# Patient Record
Sex: MALE | Race: WHITE | NOT HISPANIC OR LATINO | Employment: FULL TIME | URBAN - METROPOLITAN AREA
[De-identification: names, ages, dates, MRNs, and addresses within clinical notes are randomized per-mention and may not be internally consistent; named-entity substitution may affect disease eponyms.]

---

## 2017-01-19 ENCOUNTER — GENERIC CONVERSION - ENCOUNTER (OUTPATIENT)
Dept: OTHER | Facility: OTHER | Age: 62
End: 2017-01-19

## 2017-06-29 ENCOUNTER — GENERIC CONVERSION - ENCOUNTER (OUTPATIENT)
Dept: OTHER | Facility: OTHER | Age: 62
End: 2017-06-29

## 2017-07-13 ENCOUNTER — GENERIC CONVERSION - ENCOUNTER (OUTPATIENT)
Dept: OTHER | Facility: OTHER | Age: 62
End: 2017-07-13

## 2018-01-10 NOTE — RESULT NOTES
Verified Results  (1) TISSUE EXAM 53Mzl6075 02:07PM Neris Nice     Test Name Result Flag Reference   LAB AP CASE REPORT (Report)     Surgical Pathology Report             Case: [id number]                   Authorizing Provider: Kiara Hall MD      Collected:      09/30/2016 1407        Ordering Location:   [address] [place] [address]   Received:      09/30/2016 [address] End[facility]                               Pathologist:      Tammy Garzon MD                             Specimens:  A) - Duodenum, r/o celiac disease; wt loss                               B) - Stomach, antrum gastritis r/o h pylori                              C) - Polyp, Colorectal, ascending colon polyp                             D) - Polyp, Colorectal, transverse colon polyp                             E) - Polyp, Colorectal, sigmoid colon polyp   LAB AP FINAL DIAGNOSIS (Report)     A  Duodenum, biopsy:    - No significant histologic abnormality     - No increase in intraepithelial lymphocytes, villous blunting, acute   and chronic inflammation     - Negative for granulomas, dysplasia and malignancy  B  Gastric antrum, biopsy:    - Chronic inactive gastritis  - Negative for Helicobacter pylori organisms (immunohistochemical stain   with appropriate positive control)  - Negative for intestinal metaplasia, dysplasia and malignancy  C  Ascending colon polyp:    - Tubular adenoma  - Negative for high grade dysplasia and malignancy  D  Transverse colon polyp:    - Tubular adenoma  - Negative for high grade dysplasia and malignancy  E  Sigmoid colon polyp:    - Tubular adenoma  - Negative for high grade dysplasia and malignancy    Electronically signed by Tammy Garzon MD on 10/4/2016 at 10:47 AM   LAB AP NOTE      Interpretation performed at , [address]   LAB AP SURGICAL ADDITIONAL INFORMATION (Report)     These tests were developed and their performance characteristics   determined by [facility] or [place]  They may not be cleared or approved by the [place]  Food and   Drug Administration  The FDA has determined that such clearance or   approval is not necessary  These tests are used for clinical purposes  They should not be regarded as investigational or for research  This   laboratory has been approved by [facility], designated as a high-complexity   laboratory and is qualified to perform these tests  LAB AP GROSS DESCRIPTION (Report)     A  The specimen is received in formalin, labeled with the patient's name   and hospital number, and is designated duodenum rule out celiac   disease  The specimen consists of multiple tan soft tissue fragments   measuring in aggregate 0 5 x 0 4 x 0 1 cm  Entirely submitted  One   cassette  B  The specimen is received in formalin, labeled with the patient's name   and hospital number, and is designated antrum gastritis rule out H    pylori  The specimen consists of multiple tan red soft tissue fragments   measuring in aggregate 0 6 x 0 5 x 0 1 cm  Entirely submitted  One   cassette  Note: The estimated total formalin fixation time based upon information   provided by the submitting clinician and the standard processing schedule   is 14 25 hours  C  The specimen is received in formalin, labeled with the patient's name   and hospital number, and is designated ascending colon polyp  The   specimen consists of 2 tan soft tissue fragments measuring 0 3 and 0 4 cm  Entirely submitted  One cassette  D  The specimen is received in formalin, labeled with the patient's name   and hospital number, and is designated transverse colon polyp  The   specimen consists of a single tan soft tissue fragment measuring 0 4 cm  Entirely submitted  One cassette      E  The specimen is received in formalin, labeled with the patient's name   and hospital number, and is designated sigmoid colon polyp  The   specimen consists of several tan soft tissue fragments measuring in   aggregate 0 6 x 0 5 x 0 1 cm  Entirely submitted  One cassette  Note: The estimated total formalin fixation time based upon information   provided by the submitting clinician and the standard processing schedule   is 14 0 hours      MAC

## 2018-01-11 NOTE — RESULT NOTES
Message   Recorded as Task   Date: 09/19/2016 12:20 PM, Created By: Cristóbal Koch   Task Name: Call Patient with results   Assigned To: Cristóbal Koch   Regarding Patient: Kristan Estrada, Status: Active   Comment:    Cristóbal Koch - 19 Sep 2016 12:20 PM     Patient Phone: [phone]    CT of chest out of pelvis showed and pelvis were benign  Chest shows left lower lobe consolidation most consistent with pneumonia  Please send in a prescription for Levaquin 500 mg #10 tablets to take one daily for 10 days  Also there are multiple small pulmonary nodules seen  They will need to be followed up every 6 months for 2 years  To ensure they are benign  Radiology recommends a repeat CT chest in 2 months, I did put this in computer  Joy Sesay - 19 Sep 2016 2:47 PM     TASK EDITED                 pt notified of results and instructions    antibiotic sent in to [facility] ([facility])   mailed orders for repeat CT scans mailed to pt     Signatures   Electronically signed by : Lawrence Eastman, ; Sep 19 2016  2:48PM EST                       (Author)

## 2018-01-12 NOTE — MISCELLANEOUS
Message  The patient had missed his new patient appointment on 12/15/16  Per Dr Alla Santacruz no need for appointment sodium levels stable  Chris Dash      Active Problems    1  Abnormal weight loss (783 21) (R63 4)   2  Allergic rhinitis (477 9) (J30 9)   3  Appetite loss (783 0) (R63 0)   4  Blurry vision (368 8) (H53 8)   5  Chest discomfort (786 59) (R07 89)   6  Cough (786 2) (R05)   7  Dyspnea (786 09) (R06 00)   8  Excessive thirst (783 5) (R63 1)   9  Heme positive stool (792 1) (R19 5)   10  Hyperglycemia (790 29) (R73 9)   11  Hyperlipidemia (272 4) (E78 5)   12  Hyponatremia (276 1) (E87 1)   13  Increased frequency of urination (788 41) (R35 0)   14  Left lower lobe pneumonia (486) (J18 1)   15  Leukocytosis (288 60) (D72 829)   16  Nocturia (788 43) (R35 1)   17  Pulmonary nodules (793 19) (R91 8)   18  Tobacco use (305 1) (Z72 0)    Current Meds   1  Benzonatate 200 MG Oral Capsule; Take one capsule orally 3 times daily as needed for   cough; Therapy: 11AXX2358 to (Last Rx:15Oct2016) Ordered   2  LevoFLOXacin 500 MG Oral Tablet (Levaquin); Take 1 tablet daily; Therapy: 61WGS6700 to (Last Rx:10Bip1573)  Requested for: 15Oct2016 Ordered   3  Montelukast Sodium 10 MG Oral Tablet (Singulair); Take 1 tablet daily; Therapy: 91AKM2356 to (Last Rx:15Oct2016) Ordered    Allergies    1  No Known Drug Allergies    Signatures   Electronically signed by :  LIZETH Mathews ; Aug 28 2017  8:47AM EST

## 2018-01-12 NOTE — RESULT NOTES
Verified Results  (1) CBC/ PLT (NO DIFF) 22Hys7140 07:19AM Maura Ferrara Order Number: [id number]     Test Name Result Flag Reference   HEMATOCRIT 38 8 %  36 5-49 3   HEMOGLOBIN 13 1 g/dL  12 0-17 0   MCHC 33 8 g/dL  31 4-37 4   MCH 30 1 pg  26 8-34 3   MCV 89 fL  82-98   PLATELET COUNT 630 Thousands/uL  149-390   RBC COUNT 4 35 Million/uL  3 88-5 62   RDW 14 2 %  11 6-15 1   WBC COUNT 11 75 Thousand/uL H 4 31-10 16   MPV 8 8 fL L 8 9-12 7     (1) COMPREHENSIVE METABOLIC PANEL 71BAG6902 20:03NA Maura Ferrara Order Number: [id number]     Test Name Result Flag Reference   GLUCOSE,RANDM 86 mg/dL     If the patient is fasting, the ADA then defines impaired fasting glucose as > 100 mg/dL and diabetes as > or equal to 123 mg/dL  SODIUM 131 mmol/L L 136-145   POTASSIUM 4 8 mmol/L  3 5-5 3   CHLORIDE 96 mmol/L L 100-108   CARBON DIOXIDE 28 mmol/L  21-32   ANION GAP (CALC) 7 mmol/L  4-13   BLOOD UREA NITROGEN 9 mg/dL  5-25   CREATININE 0 76 mg/dL  0 60-1 30   Standardized to IDMS reference method   CALCIUM 8 9 mg/dL  8 3-10 1   BILI, TOTAL 0 30 mg/dL  0 20-1 00   ALK PHOSPHATAS 89 U/L     ALT (SGPT) 28 U/L  12-78   AST(SGOT) 22 U/L  5-45   ALBUMIN 3 7 g/dL  3 5-5 0   TOTAL PROTEIN 6 9 g/dL  6 4-8 2   eGFR Non-African American      >60 0 ml/min/1 73sq m   - Patient Instructions: This is a fasting blood test  Water,black tea or black  coffee only after 9:00pm the night before test Drink 2 glasses of water the morning of test - Patient Instructions: This bloodwork is non-fasting  Please drink two glasses of   water morning of bloodwork  National Kidney Disease Education Program recommendations are as follows:  GFR calculation is accurate only with a steady state creatinine  Chronic Kidney disease less than 60 ml/min/1 73 sq  meters  Kidney failure less than 15 ml/min/1 73 sq  meters       (1) HEMOGLOBIN A1C 82Yfg6514 07:19AM Alejandra Koch Order Number: [id number]     Test Name Result Flag Reference   HEMOGLOBIN A1C 5 1 %  4 2-6 3   EST  AVG  GLUCOSE 100 mg/dl       (1) LIPID PANEL, FASTING 97Vst4235 07:19AM Jimmy Maid Order Number: [id number]     Test Name Result Flag Reference   CHOLESTEROL 167 mg/dL     HDL,DIRECT 58 mg/dL  40-60   Specimen collection should occur prior to Metamizole administration due to the potential for falsely depressed results  LDL CHOLESTEROL CALCULATED 96 mg/dL  0-100   - Patient Instructions: This is a fasting blood test  Water,black tea or black  coffee only after 9:00pm the night before test   Drink 2 glasses of water the morning of test     - Patient Instructions: This is a fasting blood test  Water,black tea or black  coffee only after 9:00pm the night before test Drink 2 glasses of water the morning of test - Patient Instructions: This bloodwork is non-fasting  Please drink two glasses of   water morning of bloodwork  Triglyceride:         Normal              <150 mg/dl       Borderline High    150-199 mg/dl       High               200-499 mg/dl       Very High          >499 mg/dl  Cholesterol:         Desirable        <200 mg/dl      Borderline High  200-239 mg/dl      High             >239 mg/dl  HDL Cholesterol:        High    >59 mg/dL      Low     <41 mg/dL  LDL CALCULATED:    This screening LDL is a calculated result  It does not have the accuracy of the Direct Measured LDL in the monitoring of patients with hyperlipidemia and/or statin therapy  Direct Measure LDL (EWF125) must be ordered separately in these patients  TRIGLYCERIDES 65 mg/dL  <=150   Specimen collection should occur prior to N-Acetylcysteine or Metamizole administration due to the potential for falsely depressed results  (1) TSH 16Sep2016 07:19AM Jimmy Maid Order Number: [id number]     Test Name Result Flag Reference   TSH 0 688 uIU/mL  0 358-3 740   - Patient Instructions:  This bloodwork is non-fasting  Please drink two glasses of water morning of bloodwork  - Patient Instructions: This is a fasting blood test  Water,black tea or black  coffee only after 9:00pm the night before test Drink 2 glasses of water the morning of test - Patient Instructions: This bloodwork is non-fasting  Please drink two glasses of   water morning of bloodwork  Patients undergoing fluorescein dye angiography may retain small amounts of fluorescein in the body for 48-72 hours post procedure  Samples containing fluorescein can produce falsely depressed TSH values  If the patient had this procedure,a specimen should be resubmitted post fluorescein clearance  (1) URINALYSIS (will reflex a microscopy if leukocytes, occult blood, protein or nitrites are not within normal limits) 15Ufy5321 07:19AM Loralie Meigs Order Number: [id number]     Test Name Result Flag Reference   COLOR Yellow     CLARITY Clear     SPECIFIC GRAVITY UA 1 014  1 003-1 030   PH UA 6 5  4 5-8 0   LEUKOCYTE ESTERASE UA Negative  Negative   NITRITE UA Negative  Negative   PROTEIN UA Negative mg/dl  Negative   GLUCOSE UA Negative mg/dl  Negative   KETONES UA Negative mg/dl  Negative   UROBILINOGEN UA 0 2 E U /dl  0 2, 1 0 E U /dl   BILIRUBIN UA Negative  Negative   BLOOD UA Negative  Negative     (1) PSA, DIAGNOSTIC (FOLLOW-UP) 16Sep2016 07:19AM Loralie Meigs Order Number: [id number]     Test Name Result Flag Reference   PSA 0 5 ng/mL  0 0-4 0       Plan  Hyponatremia, Increased frequency of urination, Leukopenia    · (1) BASIC METABOLIC PROFILE; Status:Active; Requested for:36Iol1323;    · (1) CBC/ PLT (NO DIFF); Status:Active;  Requested YQO:[id number];

## 2018-01-13 NOTE — RESULT NOTES
Message  Notified by radiology on 6/28/17 patient never completed CAT scan that was ordered 11/16  Patient is on multiple pulmonary nodules  Will my office set up patient for repeat CAT scan of chest      Plan  Pulmonary nodules    · * CT CHEST WO CONTRAST; Status:Need Information - Financial Authorization;   Requested ALANNAH:[id number];     Signatures   Electronically signed by : Rogelio Thakkar DO; Jun 28 2017 11:34AM EST                       (Author)

## 2018-01-13 NOTE — MISCELLANEOUS
Message   Recorded as Task   Date: 06/28/2017 11:35 AM, Created By: Afshin Miller   Task Name: Call Back   Assigned To: Cristóbal Koch   Regarding Patient: WERO GERARD, Status: In Progress   Comment:    Cristóbal Koch - 28 Jun 2017 11:35 AM     TASK CREATED  Notified by radiology patient had an abnormal CAT scan of chest 9/16  Up pulmonary nodules and pneumonia, repeat was ordered 11/16 patient did not complete  Please have patient complete CAT scan of chest, order in computer the make appointment with me to discuss   Dustin Barrett - 29 Jun 2017 10:23 AM     TASK IN [place] - 29 Jun 2017 10:26 AM     TASK REPLIED TO: Previously Assigned To Baljinder and Ileana,Clinical Team                      Pt  states he is aware of this but is not going to have the repeat CT scan done  He does understand that this is advised but states "I will be fine and am not going to go"  Active Problems    1  Abnormal weight loss (783 21) (R63 4)   2  Allergic rhinitis (477 9) (J30 9)   3  Appetite loss (783 0) (R63 0)   4  Blurry vision (368 8) (H53 8)   5  Chest discomfort (786 59) (R07 89)   6  Cough (786 2) (R05)   7  Dyspnea (786 09) (R06 00)   8  Excessive thirst (783 5) (R63 1)   9  Heme positive stool (792 1) (R19 5)   10  Hyperglycemia (790 29) (R73 9)   11  Hyperlipidemia (272 4) (E78 5)   12  Hyponatremia (276 1) (E87 1)   13  Increased frequency of urination (788 41) (R35 0)   14  Left lower lobe pneumonia (486) (J18 1)   15  Leukocytosis (288 60) (D72 829)   16  Nocturia (788 43) (R35 1)   17  Pulmonary nodules (793 19) (R91 8)   18  Tobacco use (305 1) (Z72 0)    Current Meds   1  Benzonatate 200 MG Oral Capsule; Take one capsule orally 3 times daily as needed for   cough; Therapy: 49EJP9612 to (Last Rx:15Oct2016) Ordered   2  LevoFLOXacin 500 MG Oral Tablet (Levaquin); Take 1 tablet daily; Therapy: 59ZOD3192 to (Last Rx:15Oct2016)  Requested for: 15Oct2016 Ordered   3   Montelukast

## 2018-01-15 NOTE — RESULT NOTES
Verified Results  * CT CHEST ABDOMEN PELVIS W CONTRAST 23Hwk1206 06:58PM Cristóbal Koch     Test Name Result Flag Reference   CT CHEST ABDOMEN PELVIS W CONTRAST (Report)     CT CHEST, ABDOMEN AND PELVIS WITH IV CONTRAST     INDICATION: Loss of appetite  Weight loss of 20 pounds in the past 2 months  45 year smoking history  COMPARISON: No prior CT for comparison     TECHNIQUE: CT examination of the chest, abdomen and pelvis was performed  This examination, like all CT scans performed in the [facility], was performed utilizing techniques to minimize radiation dose exposure, including the use of    iterative reconstruction and automated exposure control  Axial, sagittal, and coronal reformatted images were submitted for interpretation  100 cc of intravenous Omnipaque 350 was administered for this examination  Enteric contrast was administered  FINDINGS:     CHEST     LUNGS: There are a couple 1 mm right upper lobe nodules anteriorly on images 25 and 26 and 28  There is a groundglass density in the right upper lobe on image 33 which is 7 mm  There is a somewhat ill-defined mixed density opacity at the right hilum    on image 35 which is about 5 x 9 mm  There is a small minimally irregular nodular density laterally in the left upper lobe on image 16, measuring 3 mm  There is a 3 to 4 mm nodule in the left upper lobe medially on image 32  There seems to be some    mild bronchial wall thickening affecting left lower lobe medial bronchial branches and there is an area of irregular consolidation within the medial left lower lobe  Its appearance favors a segmental pneumonia, however, an underlying mass could not be    entirely excluded  Has a roughly triangular shape and abuts the pleural surface of the diaphragm which is best demonstrated on the coronal reformatted images   There is another adjacent area of triangular to linear shaped consolidation more medial but    in the same general area of the lung  PLEURA: Unremarkable  HEART/GREAT VESSELS: Unremarkable for patient's age  MEDIASTINUM AND BERNA: There are small mediastinal lymph nodes which are most evident in the AP window region and pretracheal region  Significance is uncertain  They may be reactive given the finding in the lungs  AP window nodes are 9 x 9 mm and 14 x   7 mm  No hilar adenopathy seen  CHEST WALL AND LOWER NECK: Unremarkable  ABDOMEN     LIVER/BILIARY TREE: Unremarkable  GALLBLADDER: No calcified gallstones  No pericholecystic inflammatory change  SPLEEN: Unremarkable  PANCREAS: Unremarkable  ADRENAL GLANDS: Unremarkable  KIDNEYS/URETERS: Unremarkable  No hydronephrosis  STOMACH AND BOWEL: Unremarkable  APPENDIX: A normal appendix was visualized  ABDOMINOPELVIC CAVITY: No ascites or free intraperitoneal air  No lymphadenopathy  VESSELS: Atherosclerotic changes are present  No evidence of aneurysm  PELVIS     REPRODUCTIVE ORGANS: Unremarkable for patient's age  URINARY BLADDER: Unremarkable  ABDOMINAL WALL/INGUINAL REGIONS: Unremarkable  OSSEOUS STRUCTURES: No acute fracture or destructive osseous lesion  IMPRESSION:     Left lower lobe segmental consolidation with appearance favoring a pneumonia although an underlying mass is not entirely excluded  I would suggest appropriate course of therapy for community-acquired pneumonia and reevaluation with CT of chest in a    couple months to ensure resolution  There are additional small lung nodules as detailed above which will require continued surveillance, particularly in light of the smoking history  These will probably require surveillance for greater than 2 years  After the short interval follow-up for   the suspected pneumonia, consider next evaluation in about 6 months (assuming no concerning changes in the interim)  The abdomen and pelvis are unremarkable         ##sigslh##sigslh ##fuslh2##fuslh2        Workstation performed: [id number]     Signed by:   Beni Luis MD   9/19/16       Plan  Left lower lobe pneumonia, Pulmonary nodules    · * CT CHEST WO CONTRAST; Status:Need Information - Financial Authorization;   Requested for:14Nov2016;

## 2018-01-16 NOTE — RESULT NOTES
Verified Results  (1) CBC/ PLT (NO DIFF) 04Oct2016 01:29PM Christ Murphy Order Number: [id number]     Test Name Result Flag Reference   HEMATOCRIT 36 1 % L 36 5-49 3   HEMOGLOBIN 12 4 g/dL  12 0-17 0   MCHC 34 3 g/dL  31 4-37 4   MCH 29 9 pg  26 8-34 3   MCV 87 fL  82-98   PLATELET COUNT 503 Thousands/uL  149-390   RBC COUNT 4 15 Million/uL  3 88-5 62   RDW 14 2 %  11 6-15 1   WBC COUNT 7 56 Thousand/uL  4 31-10 16   MPV 9 2 fL  8 9-12 7     (1) BASIC METABOLIC PROFILE 57LWF6040 01:29PM Zach Koch Order Number: [id number]     Test Name Result Flag Reference   GLUCOSE,RANDM 76 mg/dL     If the patient is fasting, the ADA then defines impaired fasting glucose as > 100 mg/dL and diabetes as > or equal to 123 mg/dL  SODIUM 124 mmol/L L 136-145   POTASSIUM 3 9 mmol/L  3 5-5 3   CHLORIDE 90 mmol/L L 100-108   CARBON DIOXIDE 29 mmol/L  21-32   ANION GAP (CALC) 5 mmol/L  4-13   BLOOD UREA NITROGEN 12 mg/dL  5-25   CREATININE 0 75 mg/dL  0 60-1 30   Standardized to IDMS reference method   CALCIUM 8 9 mg/dL  8 3-10 1   eGFR Non-African American      >60 0 ml/min/1 73sq m   [facility] Energy Disease Education Program recommendations are as follows:  GFR calculation is accurate only with a steady state creatinine  Chronic Kidney disease less than 60 ml/min/1 73 sq  meters  Kidney failure less than 15 ml/min/1 73 sq  meters

## 2018-01-17 NOTE — MISCELLANEOUS
Message   Recorded as Task   Date: 10/17/2016 01:08 PM, Created By: Maricel Garcia   Task Name: Intake   Assigned To: NEPHROLOGY ASSDAQUAN MORSE,Team   Regarding Patient: Tonja Patricio, Status: Active   CommentAntoniette  - 17 Oct 2016 1:08 PM     TASK CREATED  Provider:  [  ]  [place]   [facility]  Date/Time:  12/15 da2      Name: Eliud levi  : [birth year]  Address:  [address] [place] [address] [phone]  Phone:  [  ]    Insurance: bc bs  Referral Needed:  No    Refering Dr : genaro  PCP: same as refering    DX: hyponatremia  Treatment Plan in the past:  No  Where/Who: [  ]  When: [  ]    Fior Bowling: Yes                        Date: 16  Imaging:                   Date: [  ]    Records Being Faxed to: [  ]    [address]:  [  ]                     Paperwork sent: Todays Date:  [  ]        Active Problems    1  Abnormal weight loss (783 21) (R63 4)   2  Allergic rhinitis (477 9) (J30 9)   3  Appetite loss (783 0) (R63 0)   4  Blurry vision (368 8) (H53 8)   5  Chest discomfort (786 59) (R07 89)   6  Cough (786 2) (R05)   7  Dyspnea (786 09) (R06 00)   8  Excessive thirst (783 5) (R63 1)   9  Heme positive stool (792 1) (R19 5)   10  Hyperglycemia (790 29) (R73 9)   11  Hyperlipidemia (272 4) (E78 5)   12  Hyponatremia (276 1) (E87 1)   13  Increased frequency of urination (788 41) (R35 0)   14  Left lower lobe pneumonia (486) (J18 9)   15  Leukocytosis (288 60) (D72 829)   16  Nocturia (788 43) (R35 1)   17  Pulmonary nodules (793 19) (R91 8)   18  Tobacco use (305 1) (Z72 0)    Current Meds   1  Benzonatate 200 MG Oral Capsule; Take one capsule orally 3 times daily as needed for   cough; Therapy: 00TJZ3738 to (Last Rx:2016) Ordered   2  LevoFLOXacin 500 MG Oral Tablet (Levaquin); Take 1 tablet daily; Therapy: 00PHQ2664 to (Last Rx:2016)  Requested for: 2016 Ordered   3  Montelukast Sodium 10 MG Oral Tablet (Singulair); Take 1 tablet daily;    Therapy: 28LMH5347 to (Last Rx:15Oct2016) Ordered    Allergies    1  No Known Drug Allergies    Signatures   Electronically signed by :  LIZETH Laureano ; Oct 18 2016 10:13AM EST

## 2018-01-18 NOTE — RESULT NOTES
Message   Recorded as Task   Date: 09/18/2016 11:11 AM, Created By: Cristóbal Koch   Task Name: Call Patient with results   Assigned To: Cristóbal Koch   Regarding Patient: Angeli Salamanca, Status: Active   Comment:    Cristóbal Koch - 18 Sep 2016 11:11 AM     Patient Phone: [phone]    labs wnl but high wbc and low sodium   repeat cbc and bmp in 1 week, order in computer   Joy Sesay - 19 Sep 2016 3:34 PM     TASK EDITED                 pt notified of BW results and instructions    orders for repeat labs mailed to pt     Signatures   Electronically signed by : Augie Gillis, ; Sep 19 2016  3:35PM EST                       (Author)